# Patient Record
(demographics unavailable — no encounter records)

---

## 2025-04-28 NOTE — PHYSICAL EXAM
[Right] : right knee [5___] : hamstring 5[unfilled]/5 [Equivocal] : equivocal Neeta [] : no pain with varus stress [TWNoteComboBox7] : flexion 110 degrees [de-identified] : extension 0 degrees

## 2025-04-28 NOTE — PHYSICAL EXAM
[Right] : right knee [5___] : hamstring 5[unfilled]/5 [Equivocal] : equivocal Neeta [] : no pain with varus stress [TWNoteComboBox7] : flexion 110 degrees [de-identified] : extension 0 degrees

## 2025-04-28 NOTE — DISCUSSION/SUMMARY
[de-identified] : General Dx Discussion The patient was advised of the diagnosis. The natural history of the pathology was explained in full to the patient in layman's terms. All questions were answered. The risks and benefits of surgical and non-surgical treatment alternatives were explained in full to the patient.  Case discussed. Cortisone injection today tolerated well. Ice as needed. He will be sent for an MRI to r/o a MMT / MCL injury. Follow up after MRI.   Entered by GRZEGORZ Warren acting as scribe. - The documentation recorded by the scribe accurately reflects the service I personally performed and the decisions made by me.

## 2025-04-28 NOTE — DISCUSSION/SUMMARY
[de-identified] : General Dx Discussion The patient was advised of the diagnosis. The natural history of the pathology was explained in full to the patient in layman's terms. All questions were answered. The risks and benefits of surgical and non-surgical treatment alternatives were explained in full to the patient.  Case discussed. Cortisone injection today tolerated well. Ice as needed. He will be sent for an MRI to r/o a MMT / MCL injury. Follow up after MRI.   Entered by GRZEGORZ Warren acting as scribe. - The documentation recorded by the scribe accurately reflects the service I personally performed and the decisions made by me.

## 2025-04-28 NOTE — HISTORY OF PRESENT ILLNESS
[8] : 8 [2] : 2 [Dull/Aching] : dull/aching [Constant] : constant [Leisure] : leisure [Ice] : ice [Walking] : walking [Retired] : Work status: retired [de-identified] : Right knee pain for a few weeks. No known injury. [] : no [FreeTextEntry1] : right knee

## 2025-04-28 NOTE — HISTORY OF PRESENT ILLNESS
[8] : 8 [2] : 2 [Dull/Aching] : dull/aching [Constant] : constant [Leisure] : leisure [Ice] : ice [Walking] : walking [Retired] : Work status: retired [de-identified] : Right knee pain for a few weeks. No known injury. [] : no [FreeTextEntry1] : right knee

## 2025-04-28 NOTE — PROCEDURE
[Large Joint Injection] : Large joint injection [Right] : of the right [Knee] : knee [Pain] : pain [Inflammation] : inflammation [X-ray evidence of Osteoarthritis on this or prior visit] : x-ray evidence of Osteoarthritis on this or prior visit [Alcohol] : alcohol [Betadine] : betadine [Ethyl Chloride sprayed topically] : ethyl chloride sprayed topically [Sterile technique used] : sterile technique used [___ cc    6mg] :  Betamethasone (Celestone) ~Vcc of 6mg [___ cc    1%] : Lidocaine ~Vcc of 1%  [___ cc    0.5%] : Bupivacaine (Marcaine) ~Vcc of 0.5%  [] : Patient tolerated procedure well [Call if redness, pain or fever occur] : call if redness, pain or fever occur [Apply ice for 15min out of every hour for the next 12-24 hours as tolerated] : apply ice for 15 minutes out of every hour for the next 12-24 hours as tolerated [Previous OTC use and PT nontherapeutic] : patient has tried OTC's including aspirin, Ibuprofen, Aleve, etc or prescription NSAIDS, and/or exercises at home and/or physical therapy without satisfactory response [Patient had decreased mobility in the joint] : patient had decreased mobility in the joint [Risks, benefits, alternatives discussed / Verbal consent obtained] : the risks benefits, and alternatives have been discussed, and verbal consent was obtained [Prior failure or difficult injection] : prior failure or difficult injection [All ultrasound images have been permanently captured and stored accordingly in our picture archiving and communication system] : All ultrasound images have been permanently captured and stored accordingly in our picture archiving and communication system [Visualization of the needle and placement of injection was performed without complication] : visualization of the needle and placement of injection was performed without complication

## 2025-05-23 NOTE — DATA REVIEWED
[MRI] : MRI [Right] : of the right [Knee] : knee [Report was reviewed and noted in the chart] : The report was reviewed and noted in the chart [I reviewed the films/CD] : I reviewed the films/CD [FreeTextEntry1] : 1. Tears in the posterior horn and body of the medial meniscus as described. 2. Small horizontal cleavage like tear in the mesial body of the lateral meniscus. 3. Moderate-to-large size joint effusion. 4. Mild osteoarthritic appearing changes as described.

## 2025-05-23 NOTE — DISCUSSION/SUMMARY
[de-identified] : General Dx Discussion The patient was advised of the diagnosis. The natural history of the pathology was explained in full to the patient in layman's terms. All questions were answered. The risks and benefits of surgical and non-surgical treatment alternatives were explained in full to the patient.  MRI pictures and report reviewed. Case discussed. surg/non surg options discussed  he does not want surgery  CSI helped some will try PT f/u 2 months

## 2025-05-23 NOTE — HISTORY OF PRESENT ILLNESS
[2] : 2 [1] : 2 [Dull/Aching] : dull/aching [Constant] : constant [Leisure] : leisure [Ice] : ice [Walking] : walking [Retired] : Work status: retired [de-identified] : Right knee MRI review , CSI helped a little  [] : Post Surgical Visit: no [FreeTextEntry1] : right knee

## 2025-05-23 NOTE — PHYSICAL EXAM
[Right] : right knee [5___] : hamstring 5[unfilled]/5 [Equivocal] : equivocal Neeta [] : no pain with varus stress [TWNoteComboBox7] : flexion 110 degrees [de-identified] : extension 0 degrees